# Patient Record
Sex: FEMALE | Race: WHITE | NOT HISPANIC OR LATINO
[De-identification: names, ages, dates, MRNs, and addresses within clinical notes are randomized per-mention and may not be internally consistent; named-entity substitution may affect disease eponyms.]

---

## 2024-09-10 ENCOUNTER — NON-APPOINTMENT (OUTPATIENT)
Age: 64
End: 2024-09-10

## 2024-09-11 ENCOUNTER — APPOINTMENT (OUTPATIENT)
Dept: PULMONOLOGY | Facility: CLINIC | Age: 64
End: 2024-09-11
Payer: COMMERCIAL

## 2024-09-11 VITALS
RESPIRATION RATE: 12 BRPM | HEART RATE: 66 BPM | DIASTOLIC BLOOD PRESSURE: 80 MMHG | SYSTOLIC BLOOD PRESSURE: 109 MMHG | HEIGHT: 65.5 IN | WEIGHT: 152 LBS | OXYGEN SATURATION: 98 % | BODY MASS INDEX: 25.02 KG/M2

## 2024-09-11 DIAGNOSIS — Z78.9 OTHER SPECIFIED HEALTH STATUS: ICD-10-CM

## 2024-09-11 DIAGNOSIS — E78.5 HYPERLIPIDEMIA, UNSPECIFIED: ICD-10-CM

## 2024-09-11 DIAGNOSIS — G47.33 OBSTRUCTIVE SLEEP APNEA (ADULT) (PEDIATRIC): ICD-10-CM

## 2024-09-11 DIAGNOSIS — Z83.438 FAMILY HISTORY OF OTHER DISORDER OF LIPOPROTEIN METABOLISM AND OTHER LIPIDEMIA: ICD-10-CM

## 2024-09-11 DIAGNOSIS — Z82.49 FAMILY HISTORY OF ISCHEMIC HEART DISEASE AND OTHER DISEASES OF THE CIRCULATORY SYSTEM: ICD-10-CM

## 2024-09-11 PROBLEM — Z00.00 ENCOUNTER FOR PREVENTIVE HEALTH EXAMINATION: Status: ACTIVE | Noted: 2024-09-11

## 2024-09-11 PROCEDURE — 99202 OFFICE O/P NEW SF 15 MIN: CPT

## 2024-09-11 NOTE — ASSESSMENT
[FreeTextEntry1] : 64 year  old woman  with sleep apnea is doing well with the CPAP.  Patient is compliant with the CPAP and benefited significantly with the CPAP. Patient is asking for a new cpap as her machine is more than 5 yrs old  Slight tiredness she is experiencing is not from untreated sleep apnea. She can check for other medical conditions like hypothyroidism with the primary care physician.

## 2024-09-11 NOTE — PHYSICAL EXAM
[General Appearance - Well Developed] : well developed [General Appearance - Well Nourished] : well nourished [Enlarged Base of the Tongue] : enlargement of the base of the tongue [II] : II [Heart Sounds] : normal S1 and S2 [Murmurs] : no murmurs [] : no respiratory distress [Auscultation Breath Sounds / Voice Sounds] : lungs were clear to auscultation bilaterally [No Focal Deficits] : no focal deficits [Oriented To Time, Place, And Person] : oriented to person, place, and time [Memory Recent] : recent memory was not impaired

## 2024-09-11 NOTE — HISTORY OF PRESENT ILLNESS
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  Patient is sleepy with [place]ston sleepiness score of 12.  Patient has very loud snoring , due to the symptoms patient underwent a sleep study at [facility] and subsequently underwent a titration study and is placed on a CPAP at 6 cm.  Patient is compliant and benefitted significantly with the CPAP.  Her symptoms have improved with the CPAP therapy.  Patient uses a dream wear nasal mask. Today she came to discuss her CPAP and to see if the numbers are acceptable.   Her average AHI is 1.5.    Today I explained to her that this is a good amount of pressure and we should leave at the same setting since the symptoms have improved.   [facility] ahi 1.5 average usage 8 hrs